# Patient Record
Sex: FEMALE | Race: WHITE | NOT HISPANIC OR LATINO | Employment: STUDENT | URBAN - METROPOLITAN AREA
[De-identification: names, ages, dates, MRNs, and addresses within clinical notes are randomized per-mention and may not be internally consistent; named-entity substitution may affect disease eponyms.]

---

## 2023-08-10 ENCOUNTER — OFFICE VISIT (OUTPATIENT)
Dept: FAMILY MEDICINE | Facility: CLINIC | Age: 23
End: 2023-08-10
Payer: COMMERCIAL

## 2023-08-10 VITALS
HEART RATE: 80 BPM | WEIGHT: 137 LBS | TEMPERATURE: 98.2 F | SYSTOLIC BLOOD PRESSURE: 129 MMHG | DIASTOLIC BLOOD PRESSURE: 91 MMHG | OXYGEN SATURATION: 100 % | RESPIRATION RATE: 14 BRPM

## 2023-08-10 DIAGNOSIS — R07.0 THROAT PAIN: ICD-10-CM

## 2023-08-10 DIAGNOSIS — B34.9 VIRAL ILLNESS: Primary | ICD-10-CM

## 2023-08-10 LAB
DEPRECATED S PYO AG THROAT QL EIA: NEGATIVE
GROUP A STREP BY PCR: NOT DETECTED

## 2023-08-10 PROCEDURE — 87651 STREP A DNA AMP PROBE: CPT | Performed by: STUDENT IN AN ORGANIZED HEALTH CARE EDUCATION/TRAINING PROGRAM

## 2023-08-10 PROCEDURE — 99203 OFFICE O/P NEW LOW 30 MIN: CPT | Performed by: STUDENT IN AN ORGANIZED HEALTH CARE EDUCATION/TRAINING PROGRAM

## 2023-08-10 RX ORDER — IBUPROFEN 200 MG
200 TABLET ORAL EVERY 4 HOURS PRN
COMMUNITY

## 2023-08-10 NOTE — PROGRESS NOTES
Assessment & Plan     Viral illness  Throat pain  22-year-old woman who presents with 2 days of URI symptoms.  Her niece was recently diagnosed with hand-foot-and-mouth.  Vitals notable for /91, otherwise within normal limits.  On exam, the patient is nontoxic-appearing, well-hydrated and perfused, with several ulcers noted on the bilateral tonsils, and an erythematous macular rash noted on the right index finger and left third and fourth digits.  Etiology likely due to viral illness.  Rapid strep test was negative.  Plan: Symptomatic management with fluids, Tylenol/ibuprofen, cepacol lozenges.  Discussed return precautions.  The patient's questions were addressed and she verbalized understanding.  - Streptococcus A Rapid Screen w/Reflex to PCR - Clinic Collect  - Group A Streptococcus PCR Throat Swab             No follow-ups on file.    Adia Palomino MD  [facility]    Sherin Mcbride is a 22 year old female who presents to clinic today for the following health issues:  Chief Complaint   Patient presents with    Fatigue    Generalized Body Aches    Throat Pain    finger and toes - tingling      Notice rashes too  Concern Hands , foot , mouth disease    Diarrhea     HPI    URI Adult    2 days ago, she developed body aches and chills  Yesterday, she developed sore throat, tingling of hands, rash on hands, nonbloody diarrhea, fatigue, swollen lymph nodes  No fever, runny nose, cough, abdominal pain, vomiting  Treatment measures tried include Tylenol/Ibuprofen, Decongestants, and Fluids.  No sick contacts. Her niece has hand-foot-mouth disease.    Review of Systems  Constitutional, HEENT, cardiovascular, pulmonary, gi and gu systems are negative, except as otherwise noted.      Objective    BP (!) 129/91   Pulse 80   Temp 98.2  F (36.8  C)   Resp 14   Wt 62.1 kg (137 lb)   LMP 07/26/2023 (Approximate)   SpO2 100%   Physical Exam   GENERAL: healthy, alert and no distress,  nontoxic-appearing  EYES: Eyes grossly normal to inspection, PERRL and conjunctivae and sclerae normal  HENT: ear canals and TM's normal, nose normal, several ulcers noted on bilateral tonsils  NECK: Right anterior cervical lymphadenopathy measuring 1 cm  RESP: lungs clear to auscultation - no rales, rhonchi or wheezes  CV: regular rate and rhythm, normal S1 S2, no S3 or S4, no murmur, normal cap refill, and peripheral pulses strong  MS: no gross musculoskeletal defects noted, no edema  SKIN: Pinpoint erythematous macules noted on the right index and left third and fourth digits, nontender and nonpruritic  NEURO: Normal nonfocal exam    Results for orders placed or performed in visit on 08/10/23 (from the past 24 hour(s))   Streptococcus A Rapid Screen w/Reflex to PCR - Clinic Collect    Specimen: Throat; Swab   Result Value Ref Range    Group A Strep antigen Negative Negative

## 2023-10-22 ENCOUNTER — HEALTH MAINTENANCE LETTER (OUTPATIENT)
Age: 23
End: 2023-10-22

## 2024-12-14 ENCOUNTER — HEALTH MAINTENANCE LETTER (OUTPATIENT)
Age: 24
End: 2024-12-14